# Patient Record
Sex: MALE | Race: WHITE | NOT HISPANIC OR LATINO | Employment: UNEMPLOYED | URBAN - NONMETROPOLITAN AREA
[De-identification: names, ages, dates, MRNs, and addresses within clinical notes are randomized per-mention and may not be internally consistent; named-entity substitution may affect disease eponyms.]

---

## 2021-06-02 ENCOUNTER — APPOINTMENT (EMERGENCY)
Dept: CT IMAGING | Facility: HOSPITAL | Age: 51
End: 2021-06-02
Payer: COMMERCIAL

## 2021-06-02 ENCOUNTER — HOSPITAL ENCOUNTER (EMERGENCY)
Facility: HOSPITAL | Age: 51
Discharge: HOME | End: 2021-06-02
Attending: EMERGENCY MEDICINE | Admitting: EMERGENCY MEDICINE
Payer: COMMERCIAL

## 2021-06-02 VITALS
TEMPERATURE: 97.5 F | HEIGHT: 70 IN | RESPIRATION RATE: 20 BRPM | WEIGHT: 160.05 LBS | SYSTOLIC BLOOD PRESSURE: 123 MMHG | BODY MASS INDEX: 22.91 KG/M2 | HEART RATE: 105 BPM | DIASTOLIC BLOOD PRESSURE: 72 MMHG | OXYGEN SATURATION: 94 %

## 2021-06-02 DIAGNOSIS — G43.909 MIGRAINE: Primary | ICD-10-CM

## 2021-06-02 DIAGNOSIS — F19.90 SUBSTANCE USE DISORDER: ICD-10-CM

## 2021-06-02 LAB
ALBUMIN SERPL BCP-MCNC: 3.7 G/DL (ref 3.5–5)
ALP SERPL-CCNC: 92 U/L (ref 46–116)
ALT SERPL W P-5'-P-CCNC: 38 U/L (ref 12–78)
AMPHETAMINES SERPL QL SCN: POSITIVE
ANION GAP SERPL CALCULATED.3IONS-SCNC: 6 MMOL/L (ref 4–13)
AST SERPL W P-5'-P-CCNC: 23 U/L (ref 5–45)
BARBITURATES UR QL: NEGATIVE
BASOPHILS # BLD AUTO: 0.04 THOUSANDS/ΜL (ref 0–0.1)
BASOPHILS NFR BLD AUTO: 1 % (ref 0–1)
BENZODIAZ UR QL: NEGATIVE
BILIRUB SERPL-MCNC: 0.38 MG/DL (ref 0.2–1)
BILIRUB UR QL STRIP: NEGATIVE
BUN SERPL-MCNC: 29 MG/DL (ref 5–25)
CALCIUM SERPL-MCNC: 9.1 MG/DL (ref 8.3–10.1)
CHLORIDE SERPL-SCNC: 104 MMOL/L (ref 100–108)
CLARITY UR: CLEAR
CO2 SERPL-SCNC: 28 MMOL/L (ref 21–32)
COCAINE UR QL: NEGATIVE
COLOR UR: YELLOW
CREAT SERPL-MCNC: 0.99 MG/DL (ref 0.6–1.3)
EOSINOPHIL # BLD AUTO: 0.2 THOUSAND/ΜL (ref 0–0.61)
EOSINOPHIL NFR BLD AUTO: 3 % (ref 0–6)
ERYTHROCYTE [DISTWIDTH] IN BLOOD BY AUTOMATED COUNT: 13.7 % (ref 11.6–15.1)
GFR SERPL CREATININE-BSD FRML MDRD: 87 ML/MIN/1.73SQ M
GLUCOSE SERPL-MCNC: 102 MG/DL (ref 65–140)
GLUCOSE UR STRIP-MCNC: NEGATIVE MG/DL
HCT VFR BLD AUTO: 44 % (ref 36.5–49.3)
HGB BLD-MCNC: 14.6 G/DL (ref 12–17)
HGB UR QL STRIP.AUTO: NEGATIVE
IMM GRANULOCYTES # BLD AUTO: 0.03 THOUSAND/UL (ref 0–0.2)
IMM GRANULOCYTES NFR BLD AUTO: 0 % (ref 0–2)
KETONES UR STRIP-MCNC: NEGATIVE MG/DL
LEUKOCYTE ESTERASE UR QL STRIP: NEGATIVE
LYMPHOCYTES # BLD AUTO: 1.94 THOUSANDS/ΜL (ref 0.6–4.47)
LYMPHOCYTES NFR BLD AUTO: 28 % (ref 14–44)
MCH RBC QN AUTO: 29.1 PG (ref 26.8–34.3)
MCHC RBC AUTO-ENTMCNC: 33.2 G/DL (ref 31.4–37.4)
MCV RBC AUTO: 88 FL (ref 82–98)
METHADONE UR QL: NEGATIVE
MONOCYTES # BLD AUTO: 0.58 THOUSAND/ΜL (ref 0.17–1.22)
MONOCYTES NFR BLD AUTO: 8 % (ref 4–12)
NEUTROPHILS # BLD AUTO: 4.08 THOUSANDS/ΜL (ref 1.85–7.62)
NEUTS SEG NFR BLD AUTO: 60 % (ref 43–75)
NITRITE UR QL STRIP: NEGATIVE
NRBC BLD AUTO-RTO: 0 /100 WBCS
OPIATES UR QL SCN: NEGATIVE
OXYCODONE+OXYMORPHONE UR QL SCN: NEGATIVE
PCP UR QL: NEGATIVE
PH UR STRIP.AUTO: 6.5 [PH]
PLATELET # BLD AUTO: 244 THOUSANDS/UL (ref 149–390)
PMV BLD AUTO: 10.3 FL (ref 8.9–12.7)
POTASSIUM SERPL-SCNC: 3.7 MMOL/L (ref 3.5–5.3)
PROT SERPL-MCNC: 7.1 G/DL (ref 6.4–8.2)
PROT UR STRIP-MCNC: NEGATIVE MG/DL
RBC # BLD AUTO: 5.01 MILLION/UL (ref 3.88–5.62)
SODIUM SERPL-SCNC: 138 MMOL/L (ref 136–145)
SP GR UR STRIP.AUTO: 1.01 (ref 1–1.03)
THC UR QL: POSITIVE
UROBILINOGEN UR QL STRIP.AUTO: 1 E.U./DL
WBC # BLD AUTO: 6.87 THOUSAND/UL (ref 4.31–10.16)

## 2021-06-02 PROCEDURE — 85025 COMPLETE CBC W/AUTO DIFF WBC: CPT | Performed by: EMERGENCY MEDICINE

## 2021-06-02 PROCEDURE — 70496 CT ANGIOGRAPHY HEAD: CPT

## 2021-06-02 PROCEDURE — 96374 THER/PROPH/DIAG INJ IV PUSH: CPT

## 2021-06-02 PROCEDURE — 96361 HYDRATE IV INFUSION ADD-ON: CPT

## 2021-06-02 PROCEDURE — 96375 TX/PRO/DX INJ NEW DRUG ADDON: CPT

## 2021-06-02 PROCEDURE — 70498 CT ANGIOGRAPHY NECK: CPT

## 2021-06-02 PROCEDURE — 80053 COMPREHEN METABOLIC PANEL: CPT | Performed by: EMERGENCY MEDICINE

## 2021-06-02 PROCEDURE — 99285 EMERGENCY DEPT VISIT HI MDM: CPT | Performed by: EMERGENCY MEDICINE

## 2021-06-02 PROCEDURE — 80307 DRUG TEST PRSMV CHEM ANLYZR: CPT | Performed by: EMERGENCY MEDICINE

## 2021-06-02 PROCEDURE — 96372 THER/PROPH/DIAG INJ SC/IM: CPT

## 2021-06-02 PROCEDURE — 81003 URINALYSIS AUTO W/O SCOPE: CPT | Performed by: EMERGENCY MEDICINE

## 2021-06-02 PROCEDURE — 36415 COLL VENOUS BLD VENIPUNCTURE: CPT | Performed by: EMERGENCY MEDICINE

## 2021-06-02 PROCEDURE — 99284 EMERGENCY DEPT VISIT MOD MDM: CPT

## 2021-06-02 RX ORDER — DIAZEPAM 5 MG/ML
2.5 INJECTION, SOLUTION INTRAMUSCULAR; INTRAVENOUS ONCE
Status: COMPLETED | OUTPATIENT
Start: 2021-06-02 | End: 2021-06-02

## 2021-06-02 RX ORDER — KETOROLAC TROMETHAMINE 30 MG/ML
15 INJECTION, SOLUTION INTRAMUSCULAR; INTRAVENOUS ONCE
Status: COMPLETED | OUTPATIENT
Start: 2021-06-02 | End: 2021-06-02

## 2021-06-02 RX ORDER — SUMATRIPTAN 6 MG/.5ML
6 INJECTION, SOLUTION SUBCUTANEOUS ONCE
Status: COMPLETED | OUTPATIENT
Start: 2021-06-02 | End: 2021-06-02

## 2021-06-02 RX ORDER — ONDANSETRON 2 MG/ML
4 INJECTION INTRAMUSCULAR; INTRAVENOUS ONCE
Status: COMPLETED | OUTPATIENT
Start: 2021-06-02 | End: 2021-06-02

## 2021-06-02 RX ADMIN — SODIUM CHLORIDE 1000 ML: 0.9 INJECTION, SOLUTION INTRAVENOUS at 19:45

## 2021-06-02 RX ADMIN — SODIUM CHLORIDE 1000 ML: 0.9 INJECTION, SOLUTION INTRAVENOUS at 21:03

## 2021-06-02 RX ADMIN — KETOROLAC TROMETHAMINE 15 MG: 30 INJECTION, SOLUTION INTRAMUSCULAR at 19:35

## 2021-06-02 RX ADMIN — IOHEXOL 100 ML: 350 INJECTION, SOLUTION INTRAVENOUS at 20:12

## 2021-06-02 RX ADMIN — ONDANSETRON 4 MG: 2 INJECTION INTRAMUSCULAR; INTRAVENOUS at 19:35

## 2021-06-02 RX ADMIN — DIAZEPAM 2.5 MG: 10 INJECTION, SOLUTION INTRAMUSCULAR; INTRAVENOUS at 22:00

## 2021-06-02 RX ADMIN — SUMATRIPTAN 6 MG: 6 INJECTION, SOLUTION SUBCUTANEOUS at 19:35

## 2021-06-02 NOTE — ED PROVIDER NOTES
History  Chief Complaint   Patient presents with    Headache     Patient states he was waiting in the parking lot for a ride  Police arrived on scene, patient reported headache beginning approx 1 hour prior  Patient reported to have multiple warrants for his arrest       49-year-old male presents emergency room with chief complaint of headache  Patient reports he was starting to have slight headache today while in a parking lot waiting for family members to return from shopping when he began to experience some discomfort  Patient was noticed by police officers to be laying down in his car and they apparently discovered that he had warrants for his arrest   Patient reports that when the police officers approaches car his headache became worse as it frequently does get worse when he becomes stressed  Patient presents emergency room via EMS with  accompanying  Patient reports that he has a right-sided to right frontal headache  He was reports that he is photophobic  He reports he feels nauseated  Patient reports that the headache began insidiously  Patient reports that he has a history of cerebral aneurysms with to that were stented    Reports that he has total of 3 cerebral aneurysms  He states that [facility] is [facility] next 2 [facility]  Patient reports that he has 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 history of substance use disorder with intravenous drugs  This subsequently caused hematogenous spread and he developed what is described by him to be consistent with a history of osteomyelitis/diskitis of the spine  Patient subsequently had spinal surgery secondary to severe bone erosion  Patient also has a history of hepatitis-C  Currently emergency room he appears rather uncomfortable is holding his head  He prefers to rest in the dark        History provided by:  Patient and EMS personnel  Headache  Pain location:  R temporal and frontal  Quality:  Sharp and stabbing  Radiates to:  Does not radiate  Severity currently:  10/10  Severity at highest:  10/10  Onset quality:  Gradual  Timing:  Constant  Progression:  Worsening  Chronicity:  Chronic  Similar to prior headaches: yes    Context: emotional stress    Relieved by:  None tried  Worsened by:  Light and sound  Ineffective treatments:  Resting in a darkened room  Associated symptoms: eye pain and photophobia    Associated symptoms: no abdominal pain, no back pain, no blurred vision, no congestion, no cough, no diarrhea, no dizziness, no ear pain, no fatigue, no fever, no focal weakness, no hearing loss, no loss of balance, no myalgias, no nausea, no neck pain, no neck stiffness, no sinus pressure, no sore throat, no tingling, no visual change, no vomiting and no weakness    Risk factors: anger        None       Past Medical History:   Diagnosis Date    Aneurysm ([facility] [address] )     Arthritis        Past Surgical History:   Procedure Laterality Date    NECK SURGERY         History reviewed  No pertinent family history  I have reviewed and agree with the history as documented  E-Cigarette/Vaping    E-Cigarette Use Never User      E-Cigarette/Vaping Substances     Social History     Tobacco Use    Smoking status: Current Some Day Smoker    Smokeless tobacco: Never Used   Substance Use Topics    Alcohol use: Not Currently    Drug use: Yes     Types: Marijuana       Review of Systems   Constitutional: Negative for activity change, fatigue and fever  HENT: Negative for congestion, ear pain, hearing loss, rhinorrhea, sinus pressure and sore throat  Eyes: Positive for photophobia and pain  Negative for blurred vision, redness and itching  Respiratory: Negative for cough, chest tightness, shortness of breath and wheezing  Cardiovascular: Negative for chest pain, palpitations and leg swelling  Gastrointestinal: Negative for abdominal pain, diarrhea, nausea and vomiting     Endocrine: Negative  Genitourinary: Negative for dysuria, flank pain and frequency  Musculoskeletal: Negative for arthralgias, back pain, myalgias, neck pain and neck stiffness  Skin: Positive for color change (Chronic color changes to his foot as per the patient's report)  Negative for pallor, rash and wound  Allergic/Immunologic: Negative  Neurological: Positive for headaches  Negative for dizziness, focal weakness, weakness and loss of balance  Hematological: Negative  Does not bruise/bleed easily  Psychiatric/Behavioral: The patient is nervous/anxious  All other systems reviewed and are negative  Physical Exam  Physical Exam  Vitals signs and nursing note reviewed  Constitutional:       General: He is in acute distress  Appearance: Normal appearance  He is well-developed  He is not toxic-appearing  HENT:      Head: Normocephalic and atraumatic  Hair is normal       Jaw: No pain on movement  Right Ear: External ear normal       Left Ear: External ear normal       Nose: Nose normal  No congestion or rhinorrhea  Mouth/Throat:      Mouth: Mucous membranes are moist       Pharynx: Oropharynx is clear  No oropharyngeal exudate or posterior oropharyngeal erythema  Eyes:      General: Lids are normal  No scleral icterus  Extraocular Movements: Extraocular movements intact  Right eye: Normal extraocular motion and no nystagmus  Left eye: Normal extraocular motion and no nystagmus  Conjunctiva/sclera: Conjunctivae normal       Right eye: Right conjunctiva is not injected  No exudate  Left eye: Left conjunctiva is not injected  No exudate  Pupils: Pupils are equal, round, and reactive to light  Neck:      Musculoskeletal: Normal range of motion and neck supple  Cardiovascular:      Rate and Rhythm: Normal rate and regular rhythm  Heart sounds: Normal heart sounds  No murmur  Pulmonary:      Effort: Pulmonary effort is normal  No respiratory distress  Breath sounds: Normal breath sounds  No decreased breath sounds, wheezing, rhonchi or rales  Abdominal:      General: Abdomen is flat  There is no distension  Palpations: Abdomen is soft  Abdomen is not rigid  Tenderness: There is no abdominal tenderness  Musculoskeletal: Normal range of motion  General: No deformity or signs of injury  Skin:     General: Skin is warm and dry  Coloration: Skin is not pale  Findings: No rash  Neurological:      General: No focal deficit present  Mental Status: He is alert and oriented to person, place, and time  Mental status is at baseline  Cranial Nerves: No cranial nerve deficit  Motor: No weakness  Psychiatric:         Attention and Perception: Attention normal          Mood and Affect: Mood is anxious  Mood is not elated           Speech: Speech normal          Behavior: Behavior normal          Vital Signs  ED Triage Vitals [06/02/21 1912]   Temperature Pulse Respirations Blood Pressure SpO2   (!) 97 2 °F (36 2 °C) 99 20 97/71 98 %      Temp Source Heart Rate Source Patient Position - Orthostatic VS BP Location FiO2 (%)   Temporal Monitor Lying Right arm --      Pain Score       Worst Possible Pain           Vitals:    06/02/21 1912 06/02/21 2119   BP: 97/71 130/86   Pulse: 99 96   Patient Position - Orthostatic VS: Lying Lying         Visual Acuity      ED Medications  Medications   ondansetron (ZOFRAN) injection 4 mg (4 mg Intravenous Given 6/2/21 1935)   ketorolac (TORADOL) injection 15 mg (15 mg Intravenous Given 6/2/21 1935)   SUMAtriptan (IMITREX) subcutaneous injection 6 mg (6 mg Subcutaneous Given 6/2/21 1935)   sodium chloride 0 9 % bolus 1,000 mL (0 mL Intravenous Stopped 6/2/21 2039)   iohexol (OMNIPAQUE) 350 MG/ML injection (SINGLE-DOSE) 100 mL (100 mL Intravenous Given 6/2/21 2012)   sodium chloride 0 9 % bolus 1,000 mL (0 mL Intravenous Stopped 6/2/21 2221)   diazepam (VALIUM) injection 2 5 mg (2 5 mg Intravenous Given 6/2/21 2200)       Diagnostic Studies  Results Reviewed     Procedure Component Value Units Date/Time    Rapid drug screen, urine [[id number]]  (Abnormal) Collected: 06/02/21 2149    Lab Status: Final result Specimen: Urine, Clean Catch Updated: 06/02/21 2216     Amph/Meth UR Positive     Barbiturate Ur Negative     Benzodiazepine Urine Negative     Cocaine Urine Negative     Methadone Urine Negative     Opiate Urine Negative     PCP Ur Negative     THC Urine Positive     Oxycodone Urine Negative    Narrative:      Presumptive report  If requested, specimen will be sent to reference lab for confirmation  FOR MEDICAL PURPOSES ONLY  IF CONFIRMATION NEEDED PLEASE CONTACT THE LAB WITHIN 5 DAYS  Drug Screen Cutoff Levels:  AMPHETAMINE/METHAMPHETAMINES  1000 ng/mL  BARBITURATES     200 ng/mL  BENZODIAZEPINES     200 ng/mL  COCAINE      300 ng/mL  METHADONE      300 ng/mL  OPIATES      300 ng/mL  PHENCYCLIDINE     25 ng/mL  THC       50 ng/mL  OXYCODONE      100 ng/mL    UA w Reflex to Microscopic w Reflex to Culture [[id number]] Collected: 06/02/21 2149    Lab Status:  In process Specimen: Urine, Clean Catch Updated: 06/02/21 2153    Comprehensive metabolic panel [[id number]]  (Abnormal) Collected: 06/02/21 1928    Lab Status: Final result Specimen: Blood from Line Updated: 06/02/21 1948     Sodium 138 mmol/L      Potassium 3 7 mmol/L      Chloride 104 mmol/L      CO2 28 mmol/L      ANION GAP 6 mmol/L      BUN 29 mg/dL      Creatinine 0 99 mg/dL      Glucose 102 mg/dL      Calcium 9 1 mg/dL      AST 23 U/L      ALT 38 U/L      Alkaline Phosphatase 92 U/L      Total Protein 7 1 g/dL      Albumin 3 7 g/dL      Total Bilirubin 0 38 mg/dL      eGFR 87 ml/min/1 73sq m     Narrative:      [facility] guidelines for Chronic Kidney Disease (CKD):     Stage 1 with normal or high GFR (GFR > 90 mL/min/1 73 square meters)    Stage 2 Mild CKD (GFR = 60-89 mL/min/1 73 square meters)   Stage 3A Moderate CKD (GFR = 45-59 mL/min/1 73 square meters)    Stage 3B Moderate CKD (GFR = 30-44 mL/min/1 73 square meters)    Stage 4 Severe CKD (GFR = 15-29 mL/min/1 73 square meters)    Stage 5 End Stage CKD (GFR <15 mL/min/1 73 square meters)  Note: GFR calculation is accurate only with a steady state creatinine    CBC and differential [[id number]] Collected: 06/02/21 1928    Lab Status: Final result Specimen: Blood from Line Updated: 06/02/21 1934     WBC 6 87 Thousand/uL      RBC 5 01 Million/uL      Hemoglobin 14 6 g/dL      Hematocrit 44 0 %      MCV 88 fL      MCH 29 1 pg      MCHC 33 2 g/dL      RDW 13 7 %      MPV 10 3 fL      Platelets 773 Thousands/uL      nRBC 0 /100 WBCs      Neutrophils Relative 60 %      Immat GRANS % 0 %      Lymphocytes Relative 28 %      Monocytes Relative 8 %      Eosinophils Relative 3 %      Basophils Relative 1 %      Neutrophils Absolute 4 08 Thousands/µL      Immature Grans Absolute 0 03 Thousand/uL      Lymphocytes Absolute 1 94 Thousands/µL      Monocytes Absolute 0 58 Thousand/µL      Eosinophils Absolute 0 20 Thousand/µL      Basophils Absolute 0 04 Thousands/µL                  CTA head and neck with and without contrast   Final Result by Peterson Huang MD (06/02 2029)         1  No evidence of acute intracranial hemorrhage  2  No evidence of flow restrictive occlusive disease or obvious aneurysm  Workstation performed: [id number]                    Procedures  Procedures         ED Course  ED Course as of Jun 02 2226 Wed Jun 02, 2021 2029 Spoke with radiologist   CT of the head is negative  CTA is negative  2029 Reports pain is a 7-8 over 10  Pt appears to be mostly sleeping at this time      2151 Patient reports that he feels as if most his pain is at baseline at this time        2155 Patient with some muscle spasms which will be treated with benzodiazepine      2222 AMPH/METH(!): Positive   2222 THC URINE(!): Positive   2224 Patient positive for methamphetamines and marijuana  No apparent medications that would cause cross-reactivity  Patient currently sleeping  Once he is awake and more stable he may be discharged home with a responsible ride  MDM    Disposition  Final diagnoses:   Migraine   Substance use disorder     Time reflects when diagnosis was documented in both MDM as applicable and the Disposition within this note     Time User Action Codes Description Comment    6/2/2021 10:24 PM Peng Purcell [Z75 [id number]] Migraine     6/2/2021 10:24 PM Peng Purcell [[id number]] Substance use disorder       ED Disposition     ED Disposition Condition Date/Time Comment    Discharge Stable Wed Jun 2, 2021 10:23 PM Mariel Avalos discharge to home/self care  Follow-up Information    None         Patient's Medications    No medications on file     No discharge procedures on file      PDMP Review     None          ED Provider  Electronically Signed by           Sheryl Marquez DO  06/02/21 5798

## 2021-06-03 NOTE — DISCHARGE INSTRUCTIONS
Follow-up with your doctor as needed for irregularly scheduled appointments    Return with any worsening

## 2021-06-03 NOTE — ED NOTES
Received phone call at [facility] stating that this patient was noted to be ambulating on the side of [address] with a [facility] wheelchair  Security and PSP notified        Ida Demarco RN  06/03/21 9547

## 2023-11-13 ENCOUNTER — RX ONLY (RX ONLY)
Age: 53
End: 2023-11-13

## 2023-11-13 ENCOUNTER — DOCTOR'S OFFICE (OUTPATIENT)
Dept: URBAN - NONMETROPOLITAN AREA CLINIC 2 | Facility: CLINIC | Age: 53
Setting detail: OPHTHALMOLOGY
End: 2023-11-13
Payer: COMMERCIAL

## 2023-11-13 DIAGNOSIS — H52.223: ICD-10-CM

## 2023-11-13 DIAGNOSIS — H52.03: ICD-10-CM

## 2023-11-13 DIAGNOSIS — H52.4: ICD-10-CM

## 2023-11-13 PROBLEM — H25.13 CATARACT NUCLEAR SCLEROSIS; BOTH EYES: Status: ACTIVE | Noted: 2023-11-13

## 2023-11-13 PROCEDURE — 92015 DETERMINE REFRACTIVE STATE: CPT

## 2023-11-13 PROCEDURE — S0620 ROUTINE OPHTHALMOLOGICAL EXA: HCPCS

## 2023-11-13 PROCEDURE — 92004 COMPRE OPH EXAM NEW PT 1/>: CPT

## 2023-11-13 ASSESSMENT — REFRACTION_MANIFEST
OS_CYLINDER: -0.50
OS_VA2: 20/20
OU_VA: 20/20
OS_AXIS: 145
OS_ADD: +2.00
OD_VA1: 20/20
OD_VA2: 20/20
OD_CYLINDER: -0.75
OS_SPHERE: +2.00
OD_SPHERE: +2.50
OD_ADD: +2.00
OD_AXIS: 050
OS_VA1: 20/20

## 2023-11-13 ASSESSMENT — REFRACTION_AUTOREFRACTION
OS_CYLINDER: -0.50
OD_CYLINDER: -0.50
OD_SPHERE: +2.00
OS_AXIS: 142
OD_AXIS: 067
OS_SPHERE: +2.00

## 2023-11-13 ASSESSMENT — VISUAL ACUITY
OD_BCVA: 20/40-2
OS_BCVA: 20/50-2

## 2023-11-13 ASSESSMENT — SPHEQUIV_DERIVED
OS_SPHEQUIV: 1.75
OS_SPHEQUIV: 1.75
OD_SPHEQUIV: 2.125
OD_SPHEQUIV: 1.75

## 2023-11-13 ASSESSMENT — CONFRONTATIONAL VISUAL FIELD TEST (CVF)
OD_FINDINGS: FULL
OS_FINDINGS: FULL

## 2023-11-13 ASSESSMENT — TONOMETRY
OS_IOP_MMHG: 12
OD_IOP_MMHG: 12

## 2023-11-13 ASSESSMENT — REFRACTION_CURRENTRX
OD_OVR_VA: 20/
OS_OVR_VA: 20/